# Patient Record
Sex: FEMALE | Race: BLACK OR AFRICAN AMERICAN | NOT HISPANIC OR LATINO | URBAN - METROPOLITAN AREA
[De-identification: names, ages, dates, MRNs, and addresses within clinical notes are randomized per-mention and may not be internally consistent; named-entity substitution may affect disease eponyms.]

---

## 2024-02-04 ENCOUNTER — INPATIENT (INPATIENT)
Facility: HOSPITAL | Age: 49
LOS: 0 days | Discharge: HOME | DRG: 812 | End: 2024-02-05
Attending: INTERNAL MEDICINE | Admitting: STUDENT IN AN ORGANIZED HEALTH CARE EDUCATION/TRAINING PROGRAM
Payer: COMMERCIAL

## 2024-02-04 VITALS
TEMPERATURE: 98 F | HEART RATE: 71 BPM | SYSTOLIC BLOOD PRESSURE: 108 MMHG | RESPIRATION RATE: 16 BRPM | DIASTOLIC BLOOD PRESSURE: 66 MMHG | OXYGEN SATURATION: 100 %

## 2024-02-04 LAB
ALBUMIN SERPL ELPH-MCNC: 4.3 G/DL — SIGNIFICANT CHANGE UP (ref 3.3–5)
ALP SERPL-CCNC: 108 U/L — SIGNIFICANT CHANGE UP (ref 40–120)
ALT FLD-CCNC: 52 U/L — HIGH (ref 10–45)
ANION GAP SERPL CALC-SCNC: 14 MMOL/L — SIGNIFICANT CHANGE UP (ref 5–17)
ANISOCYTOSIS BLD QL: SLIGHT — SIGNIFICANT CHANGE UP
APTT BLD: 28.2 SEC — SIGNIFICANT CHANGE UP (ref 24.5–35.6)
AST SERPL-CCNC: 34 U/L — SIGNIFICANT CHANGE UP (ref 10–40)
BASOPHILS # BLD AUTO: 0.1 K/UL — SIGNIFICANT CHANGE UP (ref 0–0.2)
BASOPHILS NFR BLD AUTO: 1.8 % — SIGNIFICANT CHANGE UP (ref 0–2)
BILIRUB SERPL-MCNC: 0.3 MG/DL — SIGNIFICANT CHANGE UP (ref 0.2–1.2)
BLD GP AB SCN SERPL QL: NEGATIVE — SIGNIFICANT CHANGE UP
BUN SERPL-MCNC: 28 MG/DL — HIGH (ref 7–23)
CALCIUM SERPL-MCNC: 9.5 MG/DL — SIGNIFICANT CHANGE UP (ref 8.4–10.5)
CHLORIDE SERPL-SCNC: 101 MMOL/L — SIGNIFICANT CHANGE UP (ref 96–108)
CO2 SERPL-SCNC: 25 MMOL/L — SIGNIFICANT CHANGE UP (ref 22–31)
CREAT SERPL-MCNC: 2.21 MG/DL — HIGH (ref 0.5–1.3)
EGFR: 26 ML/MIN/1.73M2 — LOW
ELLIPTOCYTES BLD QL SMEAR: SLIGHT — SIGNIFICANT CHANGE UP
EOSINOPHIL # BLD AUTO: 0.1 K/UL — SIGNIFICANT CHANGE UP (ref 0–0.5)
EOSINOPHIL NFR BLD AUTO: 1.8 % — SIGNIFICANT CHANGE UP (ref 0–6)
GLUCOSE SERPL-MCNC: 182 MG/DL — HIGH (ref 70–99)
HCT VFR BLD CALC: 23.1 % — LOW (ref 34.5–45)
HGB BLD-MCNC: 7.4 G/DL — LOW (ref 11.5–15.5)
INR BLD: 1.17 RATIO — SIGNIFICANT CHANGE UP (ref 0.85–1.18)
LYMPHOCYTES # BLD AUTO: 1.62 K/UL — SIGNIFICANT CHANGE UP (ref 1–3.3)
LYMPHOCYTES # BLD AUTO: 30.1 % — SIGNIFICANT CHANGE UP (ref 13–44)
MANUAL SMEAR VERIFICATION: SIGNIFICANT CHANGE UP
MCHC RBC-ENTMCNC: 23.6 PG — LOW (ref 27–34)
MCHC RBC-ENTMCNC: 32 GM/DL — SIGNIFICANT CHANGE UP (ref 32–36)
MCV RBC AUTO: 73.6 FL — LOW (ref 80–100)
MICROCYTES BLD QL: SLIGHT — SIGNIFICANT CHANGE UP
MONOCYTES # BLD AUTO: 0.19 K/UL — SIGNIFICANT CHANGE UP (ref 0–0.9)
MONOCYTES NFR BLD AUTO: 3.5 % — SIGNIFICANT CHANGE UP (ref 2–14)
NEUTROPHILS # BLD AUTO: 3.38 K/UL — SIGNIFICANT CHANGE UP (ref 1.8–7.4)
NEUTROPHILS NFR BLD AUTO: 62.8 % — SIGNIFICANT CHANGE UP (ref 43–77)
OVALOCYTES BLD QL SMEAR: SLIGHT — SIGNIFICANT CHANGE UP
PLAT MORPH BLD: NORMAL — SIGNIFICANT CHANGE UP
PLATELET # BLD AUTO: 211 K/UL — SIGNIFICANT CHANGE UP (ref 150–400)
POIKILOCYTOSIS BLD QL AUTO: SLIGHT — SIGNIFICANT CHANGE UP
POLYCHROMASIA BLD QL SMEAR: SLIGHT — SIGNIFICANT CHANGE UP
POTASSIUM SERPL-MCNC: 3.4 MMOL/L — LOW (ref 3.5–5.3)
POTASSIUM SERPL-SCNC: 3.4 MMOL/L — LOW (ref 3.5–5.3)
PROT SERPL-MCNC: 7.7 G/DL — SIGNIFICANT CHANGE UP (ref 6–8.3)
PROTHROM AB SERPL-ACNC: 12.2 SEC — SIGNIFICANT CHANGE UP (ref 9.5–13)
RBC # BLD: 3.14 M/UL — LOW (ref 3.8–5.2)
RBC # FLD: 16.6 % — HIGH (ref 10.3–14.5)
RBC BLD AUTO: ABNORMAL
RH IG SCN BLD-IMP: POSITIVE — SIGNIFICANT CHANGE UP
RH IG SCN BLD-IMP: POSITIVE — SIGNIFICANT CHANGE UP
SODIUM SERPL-SCNC: 140 MMOL/L — SIGNIFICANT CHANGE UP (ref 135–145)
TROPONIN T, HIGH SENSITIVITY RESULT: 11 NG/L — SIGNIFICANT CHANGE UP (ref 0–51)
WBC # BLD: 5.39 K/UL — SIGNIFICANT CHANGE UP (ref 3.8–10.5)
WBC # FLD AUTO: 5.39 K/UL — SIGNIFICANT CHANGE UP (ref 3.8–10.5)

## 2024-02-04 PROCEDURE — 99285 EMERGENCY DEPT VISIT HI MDM: CPT

## 2024-02-04 PROCEDURE — 71046 X-RAY EXAM CHEST 2 VIEWS: CPT | Mod: 26

## 2024-02-04 NOTE — ED PROVIDER NOTE - PHYSICAL EXAMINATION
GENERAL: Awake, alert, NAD  HEENT No c spine tenderness / midline   LUNGS: CTAB, no wheezes or crackles non labored breathing   CARDIAC: RRR, no m/r/g  ABDOMEN: Soft, non tender, non distended, no rebound, no guarding  EXT: No edema, no calf tenderness, no deformities.  NEURO: A&Ox3. Moving all extremities.  SKIN: Warm and dry. No rash.  PSYCH: Normal affect.

## 2024-02-04 NOTE — ED ADULT NURSE NOTE - OBJECTIVE STATEMENT
49y female PMH breast cancer on chemo to the ED from upstairs c/o of syncope. Pt was upstairs vising a family member and had 2 syncopal episodes. Pt report sthe first episode was after standing up from sitting. Pt reports then having another syncopal epsiode while trying to drink water. Pt reprots that pt feels chemotherapy medication could be causing pt to become dehydrated. pt also reports being treated for a hemoglobin of 7 but had not received any blood transfusions. Stretcher in lowest position and locked, appropriate side rails in place, room cleared of clutter and safety hazards, call bell in reach- pt oriented to use, blankets given for comfort

## 2024-02-04 NOTE — ED PROVIDER NOTE - CLINICAL SUMMARY MEDICAL DECISION MAKING FREE TEXT BOX
Given history and physical possibility of vasovagal in nature does not concern for seizure-like activity patient also states that she recently took her blood pressure medication as well so possibility orthostatic.  Will assess with labs imaging dispo pending results 49 y old f with history of breast cancer presented with vasovagal episode today , not concern for seizure-like activity patient also states that she recently took her blood pressure medication as well so possibility orthostatic.  Will assess with labs imaging dispo pending results ZR 49 y old f with history of breast cancer  diagnosed 2007 status post bilateral mastectomy ,hysterectomy and  oophorectomy y2008 ,metastatic  to lung ,reeoccurance  2011 ,anemia on chemo .also on procrit injections for severe anemia ,sp syncopal episode today  while she was visiting her friend at [facility] ,no chest pain ,no short of breath ,no headache ,no history of brain mets   will obtain orthostatics ,blood work .might need blood transfusion ,reassess ZR

## 2024-02-04 NOTE — ED PROVIDER NOTE - OBJECTIVE STATEMENT
49-year-old female chief complaint syncope x 2.  Patient was visiting a family ember upstairs in the hospital.  Patient had a syncope episode that occurred after standing up from seated position and then also had another severe flare after she was trying to drink water.  Patient denies any fever.  Patient has a history of breast cancer getting active chemotherapy.  Patient has a history of low hemoglobin is getting Procrit last dose was Wednesday.  Patient otherwise states she has been having no difficulty with staying hydrated.

## 2024-02-04 NOTE — ED ADULT NURSE NOTE - NSFALLRISKINTERV_ED_ALL_ED

## 2024-02-05 VITALS
DIASTOLIC BLOOD PRESSURE: 71 MMHG | OXYGEN SATURATION: 100 % | SYSTOLIC BLOOD PRESSURE: 123 MMHG | TEMPERATURE: 98 F | RESPIRATION RATE: 20 BRPM | HEART RATE: 65 BPM

## 2024-02-05 DIAGNOSIS — Z86.2 PERSONAL HISTORY OF DISEASES OF THE BLOOD AND BLOOD-FORMING ORGANS AND CERTAIN DISORDERS INVOLVING THE IMMUNE MECHANISM: ICD-10-CM

## 2024-02-05 DIAGNOSIS — Z29.9 ENCOUNTER FOR PROPHYLACTIC MEASURES, UNSPECIFIED: ICD-10-CM

## 2024-02-05 DIAGNOSIS — Z90.710 ACQUIRED ABSENCE OF BOTH CERVIX AND UTERUS: Chronic | ICD-10-CM

## 2024-02-05 DIAGNOSIS — N17.9 ACUTE KIDNEY FAILURE, UNSPECIFIED: ICD-10-CM

## 2024-02-05 DIAGNOSIS — Z90.11 ACQUIRED ABSENCE OF RIGHT BREAST AND NIPPLE: Chronic | ICD-10-CM

## 2024-02-05 DIAGNOSIS — R55 SYNCOPE AND COLLAPSE: ICD-10-CM

## 2024-02-05 LAB
ALBUMIN SERPL ELPH-MCNC: 4.2 G/DL — SIGNIFICANT CHANGE UP (ref 3.3–5)
ALP SERPL-CCNC: 102 U/L — SIGNIFICANT CHANGE UP (ref 40–120)
ALT FLD-CCNC: 48 U/L — HIGH (ref 10–45)
ANION GAP SERPL CALC-SCNC: 12 MMOL/L — SIGNIFICANT CHANGE UP (ref 5–17)
AST SERPL-CCNC: 27 U/L — SIGNIFICANT CHANGE UP (ref 10–40)
BILIRUB SERPL-MCNC: 0.3 MG/DL — SIGNIFICANT CHANGE UP (ref 0.2–1.2)
BUN SERPL-MCNC: 27 MG/DL — HIGH (ref 7–23)
CALCIUM SERPL-MCNC: 9.2 MG/DL — SIGNIFICANT CHANGE UP (ref 8.4–10.5)
CHLORIDE SERPL-SCNC: 103 MMOL/L — SIGNIFICANT CHANGE UP (ref 96–108)
CO2 SERPL-SCNC: 26 MMOL/L — SIGNIFICANT CHANGE UP (ref 22–31)
CREAT SERPL-MCNC: 2.01 MG/DL — HIGH (ref 0.5–1.3)
EGFR: 30 ML/MIN/1.73M2 — LOW
GLUCOSE SERPL-MCNC: 154 MG/DL — HIGH (ref 70–99)
HCT VFR BLD CALC: 26.3 % — LOW (ref 34.5–45)
HGB BLD-MCNC: 8.5 G/DL — LOW (ref 11.5–15.5)
MAGNESIUM SERPL-MCNC: 2.4 MG/DL — SIGNIFICANT CHANGE UP (ref 1.6–2.6)
MCHC RBC-ENTMCNC: 24.7 PG — LOW (ref 27–34)
MCHC RBC-ENTMCNC: 32.3 GM/DL — SIGNIFICANT CHANGE UP (ref 32–36)
MCV RBC AUTO: 76.5 FL — LOW (ref 80–100)
NRBC # BLD: 0 /100 WBCS — SIGNIFICANT CHANGE UP (ref 0–0)
PHOSPHATE SERPL-MCNC: 3.4 MG/DL — SIGNIFICANT CHANGE UP (ref 2.5–4.5)
PLATELET # BLD AUTO: 196 K/UL — SIGNIFICANT CHANGE UP (ref 150–400)
POTASSIUM SERPL-MCNC: 3.8 MMOL/L — SIGNIFICANT CHANGE UP (ref 3.5–5.3)
POTASSIUM SERPL-SCNC: 3.8 MMOL/L — SIGNIFICANT CHANGE UP (ref 3.5–5.3)
PROT SERPL-MCNC: 7.2 G/DL — SIGNIFICANT CHANGE UP (ref 6–8.3)
RBC # BLD: 3.44 M/UL — LOW (ref 3.8–5.2)
RBC # FLD: 19 % — HIGH (ref 10.3–14.5)
SODIUM SERPL-SCNC: 141 MMOL/L — SIGNIFICANT CHANGE UP (ref 135–145)
WBC # BLD: 6.58 K/UL — SIGNIFICANT CHANGE UP (ref 3.8–10.5)
WBC # FLD AUTO: 6.58 K/UL — SIGNIFICANT CHANGE UP (ref 3.8–10.5)

## 2024-02-05 PROCEDURE — 85027 COMPLETE CBC AUTOMATED: CPT

## 2024-02-05 PROCEDURE — 86850 RBC ANTIBODY SCREEN: CPT

## 2024-02-05 PROCEDURE — 71046 X-RAY EXAM CHEST 2 VIEWS: CPT

## 2024-02-05 PROCEDURE — 85730 THROMBOPLASTIN TIME PARTIAL: CPT

## 2024-02-05 PROCEDURE — 83735 ASSAY OF MAGNESIUM: CPT

## 2024-02-05 PROCEDURE — P9016: CPT

## 2024-02-05 PROCEDURE — 99285 EMERGENCY DEPT VISIT HI MDM: CPT

## 2024-02-05 PROCEDURE — 86901 BLOOD TYPING SEROLOGIC RH(D): CPT

## 2024-02-05 PROCEDURE — 85610 PROTHROMBIN TIME: CPT

## 2024-02-05 PROCEDURE — 36430 TRANSFUSION BLD/BLD COMPNT: CPT

## 2024-02-05 PROCEDURE — 86923 COMPATIBILITY TEST ELECTRIC: CPT

## 2024-02-05 PROCEDURE — 86900 BLOOD TYPING SEROLOGIC ABO: CPT

## 2024-02-05 PROCEDURE — 85025 COMPLETE CBC W/AUTO DIFF WBC: CPT

## 2024-02-05 PROCEDURE — 93005 ELECTROCARDIOGRAM TRACING: CPT

## 2024-02-05 PROCEDURE — 99223 1ST HOSP IP/OBS HIGH 75: CPT

## 2024-02-05 PROCEDURE — 97161 PT EVAL LOW COMPLEX 20 MIN: CPT

## 2024-02-05 PROCEDURE — 80053 COMPREHEN METABOLIC PANEL: CPT

## 2024-02-05 PROCEDURE — 84484 ASSAY OF TROPONIN QUANT: CPT

## 2024-02-05 PROCEDURE — 84100 ASSAY OF PHOSPHORUS: CPT

## 2024-02-05 RX ORDER — SODIUM CHLORIDE 9 MG/ML
1000 INJECTION, SOLUTION INTRAVENOUS
Refills: 0 | Status: COMPLETED | OUTPATIENT
Start: 2024-02-05 | End: 2024-02-05

## 2024-02-05 RX ORDER — ACETAMINOPHEN 500 MG
650 TABLET ORAL EVERY 6 HOURS
Refills: 0 | Status: DISCONTINUED | OUTPATIENT
Start: 2024-02-05 | End: 2024-02-05

## 2024-02-05 RX ORDER — NITROFURANTOIN MACROCRYSTAL 50 MG
100 CAPSULE ORAL
Refills: 0 | Status: DISCONTINUED | OUTPATIENT
Start: 2024-02-05 | End: 2024-02-05

## 2024-02-05 RX ORDER — LANOLIN ALCOHOL/MO/W.PET/CERES
3 CREAM (GRAM) TOPICAL AT BEDTIME
Refills: 0 | Status: DISCONTINUED | OUTPATIENT
Start: 2024-02-05 | End: 2024-02-05

## 2024-02-05 RX ORDER — ONDANSETRON 8 MG/1
4 TABLET, FILM COATED ORAL EVERY 8 HOURS
Refills: 0 | Status: DISCONTINUED | OUTPATIENT
Start: 2024-02-05 | End: 2024-02-05

## 2024-02-05 RX ORDER — EVEROLIMUS 10 MG/1
0 TABLET ORAL
Qty: 0 | Refills: 0 | DISCHARGE

## 2024-02-05 RX ORDER — POTASSIUM CHLORIDE 20 MEQ
40 PACKET (EA) ORAL ONCE
Refills: 0 | Status: COMPLETED | OUTPATIENT
Start: 2024-02-05 | End: 2024-02-05

## 2024-02-05 RX ORDER — NITROFURANTOIN MACROCRYSTAL 50 MG
0 CAPSULE ORAL
Qty: 0 | Refills: 0 | DISCHARGE

## 2024-02-05 RX ADMIN — Medication 40 MILLIEQUIVALENT(S): at 00:25

## 2024-02-05 RX ADMIN — SODIUM CHLORIDE 100 MILLILITER(S): 9 INJECTION, SOLUTION INTRAVENOUS at 03:20

## 2024-02-05 RX ADMIN — SODIUM CHLORIDE 1000 MILLILITER(S): 9 INJECTION, SOLUTION INTRAVENOUS at 12:23

## 2024-02-05 NOTE — H&P ADULT - NSICDXPASTSURGICALHX_GEN_ALL_CORE_FT
PAST SURGICAL HISTORY:  History of modified radical mastectomy of right breast     S/P hysterectomy

## 2024-02-05 NOTE — H&P ADULT - HISTORY OF PRESENT ILLNESS
49F PMHX anemia, breast ca on chemo.   Was visiting a family member in hospital when she syncopied after standing up from seated position then again when trying to drink water. Pt was transported to ED for eval.     In ED, VSS   CBC w/ no leukocytosis, hgb 7.4, coag wnl. CMP w/ K 3.4, SCr 2.21 (unknown baseline), AST 34, ALT 52. Trop 11.     Pt received 1u prbc in the ED.    49F PMHX anemia w/ sickle cell trait, metastatic breast ca with active treatment ([facility]), CKD (baseline SCr 1.5)    Was visiting a family member in hospital when she syncopized after standing up from seated position then again when trying to drink water. Pt was transported to ED for eval.     In ED, VSS   CBC w/ no leukocytosis, hgb 7.4, coag wnl. CMP w/ K 3.4, SCr 2.21 (unknown baseline), AST 34, ALT 52. Trop 11.     Pt received 1u prbc in the ED.  Admit for symptomatic anemia.     Reviewed [facility] note from 1/31/24 available on [facility]    Hgb was 7.7 on 1/31/24 w/ adequate iron stores, received procrit injection.    SCr was 1.57 on 11/2/23 and 1.92 on 1/10/24   49F PMHX anemia w/ sickle cell trait, metastatic breast ca with active treatment ([facility]), CKD (baseline SCr 1.5)    Was visiting a family member in hospital when she syncopized after standing up from seated position then again when trying to drink water. Pt was transported to ED for eval.   In ED, VSS   CBC w/ no leukocytosis, hgb 7.4, coag wnl. CMP w/ K 3.4, SCr 2.21 (unknown baseline), AST 34, ALT 52. Trop 11.   Pt received 1u prbc in the ED.  Admit for symptomatic anemia.   Pt is aware that her hgb had bee low lately. Her baseline is around 10, and was about 7 last time she saw her oncologist. Also had increased SCr recently.  This was being w/u'd outpatient when she syncopized today.     Reviewed [facility] note from 1/31/24 available on [facility]    Hgb was 7.7 on 1/31/24 w/ adequate iron stores, received procrit injection.    SCr was 1.57 on 11/2/23 and 1.92 on 1/10/24

## 2024-02-05 NOTE — H&P ADULT - NSHPADDITIONALINFOADULT_GEN_ALL_CORE
Fluid: 1u prbc + 1L IVF @ 100cc/hr   Electrolytes: replete potassium, phosphorus and magnesium to 4/3/2 respectively  Nutrition: regular   Activity: as tolerated     Med rec:   Done with patient verbally.

## 2024-02-05 NOTE — ED ADULT NURSE REASSESSMENT NOTE - NS ED NURSE REASSESS COMMENT FT1
Pt resting comfortably in bed, breathing unlabored on room air, speaking in complete sentences. Pt remains on portable cardiac monitor. Updated pt on plan of care. Pt adm to tele awaiting a bed. Safety and comfort measures maintained.

## 2024-02-05 NOTE — H&P ADULT - NSHPLABSRESULTS_GEN_ALL_CORE
7.4    5.39  )-----------( 211      ( 04 Feb 2024 22:24 )             23.1       02-04    140  |  101  |  28<H>  ----------------------------<  182<H>  3.4<L>   |  25  |  2.21<H>    Ca    9.5      04 Feb 2024 22:24    TPro  7.7  /  Alb  4.3  /  TBili  0.3  /  DBili  x   /  AST  34  /  ALT  52<H>  /  AlkPhos  108  02-04              Urinalysis Basic - ( 04 Feb 2024 22:24 )    Color: x / Appearance: x / SG: x / pH: x  Gluc: 182 mg/dL / Ketone: x  / Bili: x / Urobili: x   Blood: x / Protein: x / Nitrite: x   Leuk Esterase: x / RBC: x / WBC x   Sq Epi: x / Non Sq Epi: x / Bacteria: x        PT/INR - ( 04 Feb 2024 22:24 )   PT: 12.2 sec;   INR: 1.17 ratio         PTT - ( 04 Feb 2024 22:24 )  PTT:28.2 sec          CAPILLARY BLOOD GLUCOSE

## 2024-02-05 NOTE — H&P ADULT - PROBLEM SELECTOR PLAN 2
-ordered for 1u prbc and 1L IVF @ 100cc/hr   -monitor BMP daily, dose meds per renal fx, avoid nephrotoxins

## 2024-02-05 NOTE — ED ADULT NURSE REASSESSMENT NOTE - NS ED NURSE REASSESS COMMENT FT1
pharmacy called for updated weight and height. Pharmacy states that they need to clarify with MD first

## 2024-02-05 NOTE — H&P ADULT - NSHPPHYSICALEXAM_GEN_ALL_CORE
Vital Signs Last 24 Hrs  T(C): 36.6 (05 Feb 2024 00:55), Max: 36.8 (05 Feb 2024 00:40)  T(F): 97.9 (05 Feb 2024 00:55), Max: 98.2 (05 Feb 2024 00:40)  HR: 75 (05 Feb 2024 00:55) (71 - 75)  BP: 106/68 (05 Feb 2024 00:55) (106/68 - 111/70)  BP(mean): 81 (05 Feb 2024 00:55) (81 - 84)  RR: 18 (05 Feb 2024 00:55) (16 - 18)  SpO2: 100% (05 Feb 2024 00:55) (100% - 100%)    Parameters below as of 05 Feb 2024 00:55  Patient On (Oxygen Delivery Method): room air        CONSTITUTIONAL: Well-groomed, in no apparent distress  EYES: No conjunctival or scleral injection, non-icteric  ENMT: No external nasal lesions; MMM  RESPIRATORY: Breathing comfortably; lungs CTA without wheeze/rhonchi/rales  CARDIOVASCULAR: +S1S2, RRR, no M/G/R; pedal pulses full and symmetric; no lower extremity edema  GASTROINTESTINAL: No tenderness, +BS throughout, no rebound/guarding  SKIN: No rashes or ulcers noted  NEUROLOGIC: No gross focal neurological deficits, AAOX3  PSYCHIATRIC: mood and affect appropriate; appropriate insight and judgment Vital Signs Last 24 Hrs  T(C): 36.6 (05 Feb 2024 00:55), Max: 36.8 (05 Feb 2024 00:40)  T(F): 97.9 (05 Feb 2024 00:55), Max: 98.2 (05 Feb 2024 00:40)  HR: 75 (05 Feb 2024 00:55) (71 - 75)  BP: 106/68 (05 Feb 2024 00:55) (106/68 - 111/70)  BP(mean): 81 (05 Feb 2024 00:55) (81 - 84)  RR: 18 (05 Feb 2024 00:55) (16 - 18)  SpO2: 100% (05 Feb 2024 00:55) (100% - 100%)    Parameters below as of 05 Feb 2024 00:55  Patient On (Oxygen Delivery Method): room air        CONSTITUTIONAL: Well-groomed, in no apparent distress  EYES: No conjunctival or scleral injection, non-icteric  ENMT: No external nasal lesions; dry oral mucosa   RESPIRATORY: Breathing comfortably; lungs CTA without wheeze/rhonchi/rales  CARDIOVASCULAR: +S1S2, RRR, no M/G/R; pedal pulses full and symmetric; no lower extremity edema  GASTROINTESTINAL: No tenderness, +BS throughout, no rebound/guarding  SKIN: No rashes or ulcers noted  NEUROLOGIC: No gross focal neurological deficits, AAOX3  PSYCHIATRIC: mood and affect appropriate; appropriate insight and judgment

## 2024-02-05 NOTE — PHYSICAL THERAPY INITIAL EVALUATION ADULT - ADDITIONAL COMMENTS
Patient lives in private home with family, 5 steps to enter, 1 flight to bedroom. Patient independent with ADLs/IADLs, no DME, drives

## 2024-02-05 NOTE — ED ADULT NURSE REASSESSMENT NOTE - NS ED NURSE REASSESS COMMENT FT1
pt states, "When am I getting discharged?". Pt notified by RN that pt is admitted and currently waiting for a bed. Pt states, "can I see a provider, I would prefer to get discharged and follow up outpatient, I can also be AMAed if needed. I just don't want to be here waiting in the hallway." Rn notified pt that [facility] will be contacted. [facility] [place] [phone] and notified of the situation. ELOISE Falk states that they are preparing for rounds and will be there to see her. Pt made aware

## 2024-02-05 NOTE — PHYSICAL THERAPY INITIAL EVALUATION ADULT - PERTINENT HX OF CURRENT PROBLEM, REHAB EVAL
49F PMHX anemia w/ sickle cell trait, metastatic breast ca with active treatment ([facility]), CKD (baseline SCr 1.5). Was visiting a family member in hospital when she syncopized after standing up from seated position then again when trying to drink water. Pt was transported to ED for eval.  2/4/24: Hgb 7.4 / Hct 23.1  2/5/24: s/p 1 unit PRBCs --> Hgb 8.5 / Hct 26.3

## 2024-02-05 NOTE — DISCHARGE NOTE NURSING/CASE MANAGEMENT/SOCIAL WORK - NSDCPEFALRISK_GEN_ALL_CORE
For information on Fall & Injury Prevention, visit: [email or web address] OR  [email or web address] OR  https://www.cdc.gov/steadi/patient.html

## 2024-02-05 NOTE — DISCHARGE NOTE PROVIDER - HOSPITAL COURSE
HPI:  49F PMHX anemia w/ sickle cell trait, metastatic breast ca with active treatment ([facility]), CKD (baseline SCr 1.5)    Was visiting a family member in hospital when she syncopized after standing up from seated position then again when trying to drink water. Pt was transported to ED for eval.     Hospital Course:   Patient admitted syncopeand symptomatic anemia. In ED, VSS , CBC w/ no leukocytosis, hgb 7.4, coag wnl. CMP w/ K 3.4, SCr 2.21 (unknown baseline), AST 34, ALT 52. Trop 11.   Pt received 1u prbc in the ED.  Pt is aware that her hgb had bee low lately. Her baseline is around 10, and was about 7 last time she saw her oncologist. Also had increased SCr recently.  Reviewed [facility] note from 1/31/24 available on [email or web address]  Hgb was 7.7 on 1/31/24 w/ adequate iron stores, received procrit injection.    SCr was 1.57 on 11/2/23 and 1.92 on 1/10/24   (05 Feb 2024 00:41)Patient's creatine is 2.20 and is close to baseline 1.9. Feeling better s/p IVF.    Important Medication Changes and Reason:    Active or Pending Issues Requiring Follow-up: Follow up with oncologist and Nephrologist outpatient    Advanced Directives:   [X ] Full code  [ ] DNR  [ ] Hospice    Discharge Diagnoses:  Syncope  Symptomatic anemia

## 2024-02-05 NOTE — CHART NOTE - NSCHARTNOTEFT_GEN_A_CORE
orthostatics negative  hb improved to 8.5 s/p 1 units of prbc  cr improved to 2.01 s/p 1 bolus of IVF    will give another bolus 1L    discussed with son at bedside-- patient prefers to go home  DC today    1 hour spent in preparation of discharge  discussed with yuriy Hanson D.O.  Division of Hospital Medicine  Available on MS Teams

## 2024-02-05 NOTE — DISCHARGE NOTE PROVIDER - NSDCCPCAREPLAN_GEN_ALL_CORE_FT
PRINCIPAL DISCHARGE DIAGNOSIS  Diagnosis: Vaso vagal episode  Assessment and Plan of Treatment: HOME CARE INSTRUCTIONS  Have someone stay with you until you feel stable.  Do not drive, operate machinery, or play sports until your caregiver says it is okay.  Keep all follow-up appointments as directed by your caregiver.   Lie down right away if you start feeling like you might faint. Breathe deeply and steadily. Wait until all the symptoms have passed.Drink enough fluids to keep your urine clear or pale yellow.  If you are taking blood pressure or heart medicine, get up slowly, taking several minutes to sit and then stand. This can reduce dizziness.  SEEK IMMEDIATE MEDICAL CARE IF:  You have a severe headache.  You have unusual pain in the chest, abdomen, or back.  You are bleeding from the mouth or rectum, or you have black or tarry stool.  You have an irregular or very fast heartbeat.  You have pain with breathing.  You have repeated fainting or seizure-like jerking during an episode.  You faint when sitting or lying down.  You have confusion.  You have difficulty walking.  You have severe weakness.  You have vision problems.  If you fainted, call your local emergency services (_____________________). Do not drive yourself to the hospital        SECONDARY DISCHARGE DIAGNOSES  Diagnosis: Acute kidney injury superimposed on CKD  Assessment and Plan of Treatment: Avoid taking (NSAIDs) - (ex: Ibuprofen, Advil, Celebrex, Naprosyn)  Avoid taking any nephrotoxic agents (can harm kidneys) - Intravenous contrast for diagnostic testing, combination cold medications.  Have all medications adjusted for your renal function by your Health Care Provider.  Blood pressure control is important.  Take all medication as prescribed.      Diagnosis: Symptomatic anemia  Assessment and Plan of Treatment: s/p 1 unit PRBC

## 2024-02-05 NOTE — ED ADULT NURSE REASSESSMENT NOTE - NS ED NURSE REASSESS COMMENT FT1
report received from night RN Lea. PT a/ox3, denies pain and discomfort at the moment. vs stable, pt weighed and weight and height documented in flow sheet. Pt provided with hospital socks and mouth moisturizer

## 2024-02-05 NOTE — ED ADULT NURSE REASSESSMENT NOTE - NS ED NURSE REASSESS COMMENT FT1
#1 unit PRBC's administered as per MD order with 2 RN's at the bedside. Type and screen resulted. Consent in chart. PT educated on signs and symptoms of transfusion reaction, verbalized understanding. Safety and comfort maintained. Call bell within reach.

## 2024-02-05 NOTE — DISCHARGE NOTE NURSING/CASE MANAGEMENT/SOCIAL WORK - PATIENT PORTAL LINK FT
You can access the FollowMyHealth Patient Portal offered by [facility] by registering at the following website: [email or web address]. By joining [facility]’s FollowMyHealth portal, you will also be able to view your health information using other applications (apps) compatible with our system.

## 2024-02-05 NOTE — H&P ADULT - NSICDXPASTMEDICALHX_GEN_ALL_CORE_FT
PAST MEDICAL HISTORY:  Anemia     Breast cancer      PAST MEDICAL HISTORY:  Anemia     Breast cancer     Sickle cell trait     Stage 3 chronic kidney disease